# Patient Record
Sex: FEMALE | Race: BLACK OR AFRICAN AMERICAN | URBAN - METROPOLITAN AREA
[De-identification: names, ages, dates, MRNs, and addresses within clinical notes are randomized per-mention and may not be internally consistent; named-entity substitution may affect disease eponyms.]

---

## 2017-04-19 ENCOUNTER — OFFICE VISIT (OUTPATIENT)
Dept: OBGYN | Facility: CLINIC | Age: 27
End: 2017-04-19
Payer: COMMERCIAL

## 2017-04-19 VITALS
HEIGHT: 64 IN | WEIGHT: 209.7 LBS | OXYGEN SATURATION: 99 % | DIASTOLIC BLOOD PRESSURE: 70 MMHG | HEART RATE: 76 BPM | BODY MASS INDEX: 35.8 KG/M2 | SYSTOLIC BLOOD PRESSURE: 117 MMHG

## 2017-04-19 DIAGNOSIS — L68.9 EXCESSIVE HAIR GROWTH: ICD-10-CM

## 2017-04-19 DIAGNOSIS — N92.6 IRREGULAR MENSTRUAL CYCLE: ICD-10-CM

## 2017-04-19 DIAGNOSIS — Z30.016 ENCOUNTER FOR INITIAL PRESCRIPTION OF TRANSDERMAL PATCH HORMONAL CONTRACEPTIVE DEVICE: ICD-10-CM

## 2017-04-19 DIAGNOSIS — Z00.00 ROUTINE GENERAL MEDICAL EXAMINATION AT A HEALTH CARE FACILITY: Primary | ICD-10-CM

## 2017-04-19 PROCEDURE — G0145 SCR C/V CYTO,THINLAYER,RESCR: HCPCS | Performed by: OBSTETRICS & GYNECOLOGY

## 2017-04-19 PROCEDURE — 99395 PREV VISIT EST AGE 18-39: CPT | Performed by: OBSTETRICS & GYNECOLOGY

## 2017-04-19 RX ORDER — NORELGESTROMIN AND ETHINYL ESTRADIOL 35; 150 UG/MG; UG/MG
1 PATCH TRANSDERMAL WEEKLY
Qty: 9 PATCH | Refills: 3 | Status: SHIPPED | OUTPATIENT
Start: 2017-04-19 | End: 2018-01-12

## 2017-04-19 ASSESSMENT — ANXIETY QUESTIONNAIRES
IF YOU CHECKED OFF ANY PROBLEMS ON THIS QUESTIONNAIRE, HOW DIFFICULT HAVE THESE PROBLEMS MADE IT FOR YOU TO DO YOUR WORK, TAKE CARE OF THINGS AT HOME, OR GET ALONG WITH OTHER PEOPLE: SOMEWHAT DIFFICULT
7. FEELING AFRAID AS IF SOMETHING AWFUL MIGHT HAPPEN: NEARLY EVERY DAY
6. BECOMING EASILY ANNOYED OR IRRITABLE: SEVERAL DAYS
5. BEING SO RESTLESS THAT IT IS HARD TO SIT STILL: SEVERAL DAYS
1. FEELING NERVOUS, ANXIOUS, OR ON EDGE: SEVERAL DAYS
GAD7 TOTAL SCORE: 13
2. NOT BEING ABLE TO STOP OR CONTROL WORRYING: NEARLY EVERY DAY
3. WORRYING TOO MUCH ABOUT DIFFERENT THINGS: NEARLY EVERY DAY

## 2017-04-19 ASSESSMENT — PATIENT HEALTH QUESTIONNAIRE - PHQ9: 5. POOR APPETITE OR OVEREATING: SEVERAL DAYS

## 2017-04-19 NOTE — MR AVS SNAPSHOT
After Visit Summary   4/19/2017    Adelina Bansal    MRN: [id number]           Patient Information     Date Of Birth          1990        Visit Information        Provider Department      4/19/2017 8:30 AM Malina Mccord DO [facility]        Today's Diagnoses     Routine general medical examination at a health care facility    -  1      Care Instructions                                                         If you have any questions regarding your visit, Please contact your care team.    [facility] CLINIC HOURS TELEPHONE NUMBER   Malina Mccord DO.    ANDRES Ardon -    CAIT Brown RN       [facility], [facility]  8:30a.m-5:00 p.m   [facility]  [address] [place] [address]  [phone] ask for [facility]    Imaging [phone]       Urgent Care locations:    [facility] Saturday and Sunday   9 am - 5 pm    Monday-Friday   12 pm - 8 pm  Saturday and Sunday   9 am - 5 pm   [phone]     [facility] Labor and Delivery:  [phone]    If you need a medication refill, please contact your pharmacy. Please allow 3 business days for your refill to be completed.  As always, Thank you for trusting us with your healthcare needs!              Follow-ups after your visit        Who to contact     If you have questions or need follow up information about today's clinic visit or your schedule please contact [facility] directly at [phone].  Normal or non-critical lab and imaging results will be communicated to you by MyChart, letter or phone within 4 business days after the clinic has received the results. If you do not hear from us within 7 days, please contact the clinic through MyChart or phone. If you have a critical or abnormal lab result, we will notify you by phone as soon as  "possible.  Submit refill requests through [employer] or call your pharmacy and they will forward the refill request to us. Please allow 3 business days for your refill to be completed.          Additional Information About Your Visit        [employer] Malaw[employer]arT[employer]opSwap Information     [employer] gives you secure access to your electronic health record. If you see a primary care provider, you can also send messages to your care team and make appointments. If you have questions, please call your primary care clinic.  If you do not have a primary care provider, please call [phone] and they will assist you.        Care EveryWhere ID     This is your Care EveryWhere ID. This could be used by other organizations to access your [place] medical records  [id number]        Your Vitals Were     Pulse Height Last Period Pulse Oximetry Breastfeeding? BMI (Body Mass Index)    76 1.613 m (5' 3.5\") (LMP Unknown) 99% No 36.56 kg/m2       Blood Pressure from Last 3 Encounters:   04/19/17 117/70   03/10/16 110/68   02/29/16 94/66    Weight from Last 3 Encounters:   04/19/17 95.1 kg (209 lb 11.2 oz)   03/10/16 86.6 kg (191 lb)   02/29/16 87.2 kg (192 lb 4.8 oz)              We Performed the Following     Pap imaged thin layer screen reflex to HPV if ASCUS - recommend age 25 - 29          Today's Medication Changes          These changes are accurate as of: 4/19/17  8:36 AM.  If you have any questions, ask your nurse or doctor.               Stop taking these medicines if you haven't already. Please contact your care team if you have questions.     clindamycin 1 % solution   Commonly known as:  CLEOCIN T   Stopped by:  Malina Mccord, DO           levonorgestrel-ethinyl estradiol 0.1-20 MG-MCG per tablet   Commonly known as:  VERENA ELAINE LESSINA   Stopped by:  Malina Mccord DO           triamcinolone 0.1 % cream   Commonly known as:  KENALOG   Stopped by:  Malina Mccord DO                    Primary Care Provider Office Phone # " Fax #    Patricia Maya [phone]       [facility] [address]  [facility] [address]        Thank you!     Thank you for choosing [facility]  for your care. Our goal is always to provide you with excellent care. Hearing back from our patients is one way we can continue to improve our services. Please take a few minutes to complete the written survey that you may receive in the mail after your visit with us. Thank you!             Your Updated Medication List - Protect others around you: Learn how to safely use, store and throw away your medicines at www.disposemymeds.org.          This list is accurate as of: 4/19/17  8:36 AM.  Always use your most recent med list.                   Brand Name Dispense Instructions for use    norelgestromin-ethinyl estradiol 150-35 MCG/24HR patch    ORTHO EVRA    9 patch    Place 1 patch onto the skin once a week

## 2017-04-19 NOTE — NURSING NOTE
"Chief Complaint   Patient presents with     Physical     ?PCOS       Initial /70 (Cuff Size: Adult Large)  Pulse 76  Ht 1.613 m (5' 3.5\")  Wt 95.1 kg (209 lb 11.2 oz)  LMP  (LMP Unknown)  SpO2 99%  Breastfeeding? No  BMI 36.56 kg/m2 Estimated body mass index is 36.56 kg/(m^2) as calculated from the following:    Height as of this encounter: 1.613 m (5' 3.5\").    Weight as of this encounter: 95.1 kg (209 lb 11.2 oz).  Medication Reconciliation:   Reva Egan, [facility]  April 19, 2017 8:35 AM        "

## 2017-04-19 NOTE — PROGRESS NOTES
Adelina is a 26 year old female, , who is here for her annual exam and has concerns regarding possible PCOS.  She admits to irregular menses where she may skip up to 6 months at a time.  She also has noticed unwanted facial hair in the chin area so met with a dermatologist.  He suggested that she be treated for PCOS first and if this failed, then he would discuss hair removal options with her.  She has not been using anything for contraception over the past 6 months since she forgot to  her script for the Ortho Evra patch after her last visit.  She dislikes taking the BCP since it causes her stomach upset.  Her last US from 3/10/16 was normal without signs of PCOS so will recheck a year+ later.  She admits to a strong family hx of insulin-dependent diabetes including her dad and older sister.  She is not in a fasting state this morning so will have her return for a glucose screen plus a FSH and LH.  She is not wanting to try for pregnancy at this time but may decide to in 6 or more months.  She restarted smoking 1/ ppd so was encouraged to quit.  She declined a script for medication for smoking cessation since she feels that she can quit on her own.      ROS: Ten point review of systems was reviewed and negative except the above.    Health Maintenance   Topic Date Due     TETANUS IMMUNIZATION (SYSTEM ASSIGNED)  2017     INFLUENZA VACCINE (SYSTEM ASSIGNED)  2017     PAP Q3 YR  10/29/2018     LIPID MONITORING Q5 YEARS (NO INBASKET)  2018      Last pap: normal 10/29/15  Last Mammogram: none  Last Dexa: not applicable  Last Colonoscopy: not applicable  Lab Results   Component Value Date    CHOL 158 2013     Lab Results   Component Value Date    HDL 33 2013     Lab Results   Component Value Date     2013     Lab Results   Component Value Date    TRIG 119 2013     Lab Results   Component Value Date    CHOLHDLRATIO 4.9 2013         OBHX:      PSH:   Past  "Surgical History:   Procedure Laterality Date     NO HISTORY OF SURGERY           PMH: Her past medical, surgical, and obstetric histories were reviewed and are documented in their appropriate chart areas.    ALL/Meds: Her medication and allergy histories were reviewed and are documented in their appropriate chart areas.    SH/FMH: Her social and family history was reviewed and documented in its appropriate chart area.    PE: /70 (Cuff Size: Adult Large)  Pulse 76  Ht 1.613 m (5' 3.5\")  Wt 95.1 kg (209 lb 11.2 oz)  LMP  (LMP Unknown)  SpO2 99%  Breastfeeding? No  BMI 36.56 kg/m2  Body mass index is 36.56 kg/(m^2).    General Appearance:  healthy, alert, active, no distress  Cardiovascular:  Regular rate and Rhythm without murmur  Neck: Supple, no adenopathy and thyroid normal  Lungs:  Clear, without wheeze, rale or rhonchi  Breast: normal breast exam  Abdomen: Benign, Soft, flat, non-tender, No masses, organomegaly, No inguinal nodes and Bowel sounds normoactive.   Pelvic:       - Ext: Vulva and perineum are normal without lesion, mass or discharge        - Urethra: normal without discharge        - Urethral Meatus: normal appearance       - Bladder: no tenderness, no masses       - Vagina: Normal mucosa, no discharge        - Cervix: normal and nulliparous       - Uterus:Normal shape, position and consistency        - Adnexa: Normal without masses or tenderness       - Rectal: deferred    A/P:   Assessment:  Well woman exam (Annual)  Contraceptive consult  PCOS consult  Strong family hx of diabetes (two first-degree relatives)  Smoking cessation consult  Plan:   -  I discussed the new pap recommendations regarding screening.  Explained the rationale for increased intervals between paps.  Questions asked and answered.  She does agree to this regiment.  Pap was performed and submitted   -  BC: Ortho Evra patch script sent to her pharmacy with instructions reviewed   -  Return in a fasting state for " labwork including a glucose given her strong family hx of insulin-dependent diabetes   -  Schedule a pelvic US and labwork for PCOS workup   -  Smoking cessation discussed and pt will try Nicorette gum   -  Refer to Derm if continues to have unwanted hair growth  Orders Placed This Encounter   Procedures     US Pelvic Complete w Transvaginal     Pap imaged thin layer screen reflex to HPV if ASCUS - recommend age 25 - 29     Follicle stimulating hormone     Lutropin     Glucose     DERMATOLOGY REFERRAL      - Encouraged self-breast exam   - Encouraged low fat diet, regular exercise, and adequate calcium intake.    Malina Mccord, , FACOG, FACS

## 2017-04-19 NOTE — PATIENT INSTRUCTIONS
If you have any questions regarding your visit, Please contact your care team.    [facility] CLINIC HOURS TELEPHONE NUMBER   Malina DO Flex.    ANDRES Ardon -    CAIT Brown RN       Monday, Wednesday, Thursday and Friday, [place]  8:30a.m-5:00 p.m   [facility]  [address] [place] [address]  [phone] ask for [facility]    Imaging [phone]       Urgent Care locations:    [facility] Saturday and Sunday   9 am - 5 pm    Monday-Friday   12 pm - 8 pm  Saturday and Sunday   9 am - 5 pm   [phone]     [facility] Labor and Delivery:  [phone]    If you need a medication refill, please contact your pharmacy. Please allow 3 business days for your refill to be completed.  As always, Thank you for trusting us with your healthcare needs!

## 2017-04-20 ASSESSMENT — ANXIETY QUESTIONNAIRES: GAD7 TOTAL SCORE: 13

## 2017-04-20 ASSESSMENT — PATIENT HEALTH QUESTIONNAIRE - PHQ9: SUM OF ALL RESPONSES TO PHQ QUESTIONS 1-9: 6

## 2017-04-21 LAB
COPATH REPORT: NORMAL
PAP: NORMAL

## 2017-06-26 DIAGNOSIS — N92.6 IRREGULAR MENSTRUAL CYCLE: ICD-10-CM

## 2017-06-26 DIAGNOSIS — L68.9 EXCESSIVE HAIR GROWTH: ICD-10-CM

## 2017-06-26 LAB
FSH SERPL-ACNC: 5.6 IU/L
GLUCOSE SERPL-MCNC: 98 MG/DL (ref 70–99)
LH SERPL-ACNC: 9.1 IU/L

## 2017-06-26 PROCEDURE — 83001 ASSAY OF GONADOTROPIN (FSH): CPT | Performed by: OBSTETRICS & GYNECOLOGY

## 2017-06-26 PROCEDURE — 82947 ASSAY GLUCOSE BLOOD QUANT: CPT | Performed by: OBSTETRICS & GYNECOLOGY

## 2017-06-26 PROCEDURE — 83002 ASSAY OF GONADOTROPIN (LH): CPT | Performed by: OBSTETRICS & GYNECOLOGY

## 2017-06-26 PROCEDURE — 36415 COLL VENOUS BLD VENIPUNCTURE: CPT | Performed by: OBSTETRICS & GYNECOLOGY

## 2017-07-27 ENCOUNTER — RADIANT APPOINTMENT (OUTPATIENT)
Dept: ULTRASOUND IMAGING | Facility: CLINIC | Age: 27
End: 2017-07-27
Attending: OBSTETRICS & GYNECOLOGY
Payer: COMMERCIAL

## 2017-07-27 DIAGNOSIS — N92.6 IRREGULAR MENSTRUAL CYCLE: ICD-10-CM

## 2017-07-27 PROCEDURE — 76856 US EXAM PELVIC COMPLETE: CPT | Performed by: STUDENT IN AN ORGANIZED HEALTH CARE EDUCATION/TRAINING PROGRAM

## 2017-07-27 PROCEDURE — 76830 TRANSVAGINAL US NON-OB: CPT | Performed by: STUDENT IN AN ORGANIZED HEALTH CARE EDUCATION/TRAINING PROGRAM

## 2017-08-02 ENCOUNTER — TELEPHONE (OUTPATIENT)
Dept: OBGYN | Facility: CLINIC | Age: 27
End: 2017-08-02

## 2017-08-02 NOTE — TELEPHONE ENCOUNTER
[facility] Call Center    Phone Message    Name of Caller: Adelina    Phone Number: Cell number on file:    Telephone Information:   Mobile [phone]       Best time to return call: any    May a detailed message be left on voicemail: yes    Relation to patient: Self    Reason for Call: Other: Patient has quetions about lab work. Please advise     Action Taken: Message routed to:  [facility] p [phone]

## 2017-08-03 NOTE — TELEPHONE ENCOUNTER
Notes Recorded by Malina Mccord DO on 7/29/2017 at 3:39 PM  Your recent ultrasound results showed probable polycystic ovarian syndrome so is consistent with your history.  Please call with any questions    Patient called as she received the message as below but not sure if her dx is definitive. Patient stated she would like to know about a tx plan and agreed to discuss in an OV. Patient stated she has not started Ortho Sunitha patch yet but will do that soon. Patient agreed to wait for an appt with Dr. Gabriel on 08-23-17 at 1600 as she wanted a late day appt. Patient very appreciative of assistance. Jeanette Reich RN, BAN

## 2017-08-23 ENCOUNTER — OFFICE VISIT (OUTPATIENT)
Dept: OBGYN | Facility: CLINIC | Age: 27
End: 2017-08-23
Payer: COMMERCIAL

## 2017-08-23 VITALS
SYSTOLIC BLOOD PRESSURE: 110 MMHG | DIASTOLIC BLOOD PRESSURE: 75 MMHG | WEIGHT: 221.9 LBS | HEART RATE: 83 BPM | BODY MASS INDEX: 38.69 KG/M2 | OXYGEN SATURATION: 97 %

## 2017-08-23 DIAGNOSIS — E28.2 PCOS (POLYCYSTIC OVARIAN SYNDROME): ICD-10-CM

## 2017-08-23 DIAGNOSIS — Z13.1 SCREENING FOR DIABETES MELLITUS: Primary | ICD-10-CM

## 2017-08-23 PROCEDURE — 99214 OFFICE O/P EST MOD 30 MIN: CPT | Performed by: OBSTETRICS & GYNECOLOGY

## 2017-08-23 NOTE — NURSING NOTE
"Chief Complaint   Patient presents with     RECHECK     go over ultrasound--? PCOS       Initial /75  Pulse 83  Wt 100.7 kg (221 lb 14.4 oz)  LMP 07/01/2017  SpO2 97%  Breastfeeding? No  BMI 38.69 kg/m2 Estimated body mass index is 38.69 kg/(m^2) as calculated from the following:    Height as of 4/19/17: 1.613 m (5' 3.5\").    Weight as of this encounter: 100.7 kg (221 lb 14.4 oz).  Medication Reconciliation:   Reva Egan, [facility]  August 23, 2017 4:28 PM        "

## 2017-08-23 NOTE — PROGRESS NOTES
"This 28 y/o female, , presents to discuss her recent US results and PCOS diagnosis.  She was prescribed the Ortho Evra patch at her last appt in 2017, but has not started using them yet since she states that she is a \"hypochondriac.\"  We discussed her fears and she now feels comfortable taking these.  /75  Pulse 83  Wt 100.7 kg (221 lb 14.4 oz)  LMP 2017  SpO2 97%  Breastfeeding? No  BMI 38.69 kg/m2  ROS:  10 systems were reviewed and was + for PCOS, hx of chlamydia, and esophageal reflux.  A PE was not performed today.  Assessment - PCOS  Plan - Her recent US results from 17 were reviewed with the patient.  Both written and verbal information were provided on PCOS and she plans to start the Ortho Evra patches this month.  She is considering trying for pregnancy in 1-2 years so will remain on contraception (combined estrogen and progesterone) until then.  Will check a fasting glucose for diabetic screening.  All her questions were addressed.  This was a 30-minute visit and over 50% of the time was spent in direct pt consultation.  "

## 2017-08-23 NOTE — PATIENT INSTRUCTIONS
If you have any questions regarding your visit, Please contact your care team.    Women [facility] CLINIC HOURS TELEPHONE NUMBER   Malina DO Flex.    ANDRES Ardon -    CAIT Brown RN       Monday, Wednesday, Thursday and Friday, [place]  8:30a.m-5:00 p.m   [facility]  [address] [place] [address]  [phone] ask for [facility]    Imaging [phone]       Urgent Care locations:    [facility] Saturday and Sunday   9 am - 5 pm    Monday-Friday   12 pm - 8 pm  Saturday and Sunday   9 am - 5 pm   [phone]     [facility] Labor and Delivery:  [phone]    If you need a medication refill, please contact your pharmacy. Please allow 3 business days for your refill to be completed.  As always, Thank you for trusting us with your healthcare needs!

## 2017-08-23 NOTE — MR AVS SNAPSHOT
After Visit Summary   8/23/2017    Adelina Bansal    MRN: [id number]           Patient Information     Date Of Birth          1990        Visit Information        Provider Department      8/23/2017 4:30 PM Malina Mccord DO [facility]        Care Instructions                                                         If you have any questions regarding your visit, Please contact your care team.    [facility] CLINIC HOURS TELEPHONE NUMBER   Malina Mccord DO.    ANDRES Ardon -    CAIT Brown RN       Monday, Wednesday, Thursday and Friday, [place]  8:30a.m-5:00 p.m   [facility]  [address]  [place], [place] [address] [phone] ask for [facility]    Imaging [phone]       Urgent Care locations:    [facility] Saturday and Sunday   9 am - 5 pm    Monday-Friday   12 pm - 8 pm  Saturday and Sunday   9 am - 5 pm   [phone]     [facility] Labor and Delivery:  [phone]    If you need a medication refill, please contact your pharmacy. Please allow 3 business days for your refill to be completed.  As always, Thank you for trusting us with your healthcare needs!                Follow-ups after your visit        Your next 10 appointments already scheduled     Aug 23, 2017  4:30 PM CDT   Office Visit with Malina Mccord DO   [facility] ([facility])    [address]  [facility] [phone] [phone]           Bring a current list of meds and any records pertaining to this visit. For Physicals, please bring immunization records and any forms needing to be filled out. Please arrive 10 minutes early to complete paperwork.              Who to contact     If you have questions or need follow up information about today's clinic visit or your schedule please contact [facility]  [place] directly at [phone].  Normal or non-critical lab and imaging results will be communicated to you by dscoveredhart, letter or phone within 4 business days after the clinic has received the results. If you do not hear from us within 7 days, please contact the clinic through dscoveredhart or phone. If you have a critical or abnormal lab result, we will notify you by phone as soon as possible.  Submit refill requests through [employer] or call your pharmacy and they will forward the refill request to us. Please allow 3 business days for your refill to be completed.          Additional Information About Your Visit        dscoveredharVisualnet Information     [employer] gives you secure access to your electronic health record. If you see a primary care provider, you can also send messages to your care team and make appointments. If you have questions, please call your primary care clinic.  If you do not have a primary care provider, please call [phone] and they will assist you.        Care EveryWhere ID     This is your Care EveryWhere ID. This could be used by other organizations to access your [place] medical records  [id number]        Your Vitals Were     Pulse Last Period Pulse Oximetry Breastfeeding? BMI (Body Mass Index)       83 07/01/2017 97% No 38.69 kg/m2        Blood Pressure from Last 3 Encounters:   08/23/17 110/75   04/19/17 117/70   03/10/16 110/68    Weight from Last 3 Encounters:   08/23/17 100.7 kg (221 lb 14.4 oz)   04/19/17 95.1 kg (209 lb 11.2 oz)   03/10/16 86.6 kg (191 lb)              Today, you had the following     No orders found for display         Today's Medication Changes          These changes are accurate as of: 8/23/17  4:29 PM.  If you have any questions, ask your nurse or doctor.               These medicines have changed or have updated prescriptions.        Dose/Directions    norelgestromin-ethinyl estradiol 150-35 MCG/24HR patch   Commonly known as:  ORTHO EVRA   This may have changed:   Another medication with the same name was removed. Continue taking this medication, and follow the directions you see here.   Used for:  Encounter for initial prescription of transdermal patch hormonal contraceptive device   Changed by:  Malina Mccord DO        Dose:  1 patch   Place 1 patch onto the skin once a week   Quantity:  9 patch   Refills:  3                Primary Care Provider Office Phone # Fax #    Patricia Maya [phone] [address]  [facility] [address]        Equal Access to Services     [facility]: Hadii aad ku hadasho Soomaali, waaxda luqadaha, qaybta kaalmada adeegyada, waxay idiin hayaan georgi bourne . So [facility] [phone].    ATENCIÓN: Si habla español, tiene a beauchamp disposición servicios gratuitos de asistencia lingüística. Llame al [phone].    We comply with applicable federal civil rights laws and [place] laws. We do not discriminate on the basis of race, color, national origin, age, disability sex, sexual orientation or gender identity.            Thank you!     Thank you for choosing [facility]  for your care. Our goal is always to provide you with excellent care. Hearing back from our patients is one way we can continue to improve our services. Please take a few minutes to complete the written survey that you may receive in the mail after your visit with us. Thank you!             Your Updated Medication List - Protect others around you: Learn how to safely use, store and throw away your medicines at www.disposemymeds.org.          This list is accurate as of: 8/23/17  4:29 PM.  Always use your most recent med list.                   Brand Name Dispense Instructions for use Diagnosis    norelgestromin-ethinyl estradiol 150-35 MCG/24HR patch    ORTHO EVRA    9 patch    Place 1 patch onto the skin once a week    Encounter for initial prescription of transdermal patch hormonal contraceptive device

## 2018-01-12 ENCOUNTER — RADIANT APPOINTMENT (OUTPATIENT)
Dept: GENERAL RADIOLOGY | Facility: CLINIC | Age: 28
End: 2018-01-12
Attending: NURSE PRACTITIONER
Payer: COMMERCIAL

## 2018-01-12 ENCOUNTER — OFFICE VISIT (OUTPATIENT)
Dept: PEDIATRICS | Facility: CLINIC | Age: 28
End: 2018-01-12
Payer: COMMERCIAL

## 2018-01-12 VITALS
WEIGHT: 228.9 LBS | SYSTOLIC BLOOD PRESSURE: 99 MMHG | DIASTOLIC BLOOD PRESSURE: 60 MMHG | TEMPERATURE: 97.2 F | HEART RATE: 78 BPM | HEIGHT: 64 IN | OXYGEN SATURATION: 99 % | BODY MASS INDEX: 39.08 KG/M2

## 2018-01-12 DIAGNOSIS — M25.562 LEFT KNEE PAIN, UNSPECIFIED CHRONICITY: ICD-10-CM

## 2018-01-12 DIAGNOSIS — F17.200 TOBACCO USE DISORDER: ICD-10-CM

## 2018-01-12 DIAGNOSIS — M25.562 LEFT KNEE PAIN, UNSPECIFIED CHRONICITY: Primary | ICD-10-CM

## 2018-01-12 PROCEDURE — 99214 OFFICE O/P EST MOD 30 MIN: CPT | Performed by: NURSE PRACTITIONER

## 2018-01-12 PROCEDURE — 73560 X-RAY EXAM OF KNEE 1 OR 2: CPT | Mod: LT | Performed by: RADIOLOGY

## 2018-01-12 RX ORDER — BUPROPION HYDROCHLORIDE 150 MG/1
TABLET, EXTENDED RELEASE ORAL
Qty: 60 TABLET | Refills: 2 | Status: SHIPPED | OUTPATIENT
Start: 2018-01-12

## 2018-01-12 NOTE — PROGRESS NOTES
SUBJECTIVE:   Adelina Bansal is a 27 year old female who presents to clinic today for the following health issues:    Joint Pain    Onset: 1 year    Description:   Location: left knee  Character: Sharp    Intensity: mild    Progression of Symptoms: worse    Accompanying Signs & Symptoms:  Other symptoms: none    History:   Previous similar pain: no       Precipitating factors:   Trauma or overuse: YES- has gained 40 lbs within the last year    Alleviating factors:  Improved by: rest/inactivity    Therapies Tried and outcome: massage, knee brace, tylenol   Left knee pain for at least a year  Got worse with weight gain  Got worse after being on the knee for work at [employer] and on her feet  No instability   Just takes the Tylenol       2. Also wants to go on the gum for quitting smoking   Trying to transition to a health lifestyle      Problem list and histories reviewed & adjusted, as indicated.  Additional history: as documented    Patient Active Problem List   Diagnosis     Abnormal menses     CARDIOVASCULAR SCREENING; LDL GOAL LESS THAN 160     Chlamydia infection     Obesity     Tobacco use disorder     Contraception     Esophageal reflux     Past Surgical History:   Procedure Laterality Date     NO HISTORY OF SURGERY         Social History   Substance Use Topics     Smoking status: Current Every Day Smoker     Packs/day: 0.10     Years: 5.00     Types: Cigarettes     Start date: 1/11/2011     Last attempt to quit: 6/1/2015     Smokeless tobacco: Never Used     Alcohol use 3.5 oz/week      Comment: Socially     Family History   Problem Relation Age of Onset     C.A.D. Paternal Grandmother      3 MIs     DIABETES Paternal Grandmother      Hypertension Paternal Grandmother      CEREBROVASCULAR DISEASE Paternal Grandmother      DIABETES Father      Hypertension Mother      Connective Tissue Disorder Maternal Aunt      Lupus     Asthma Sister      twin     Asthma Sister          No current outpatient  "prescriptions on file.     No Known Allergies  Labs reviewed in EPIC      Reviewed and updated as needed this visit by clinical staffTobacco  Allergies  Meds  Med Hx  Surg Hx  Fam Hx  Soc Hx      Reviewed and updated as needed this visit by Provider         ROS:  Constitutional, HEENT, cardiovascular, pulmonary, gi and gu systems are negative, except as otherwise noted.      OBJECTIVE:   BP 99/60 (BP Location: Right arm, Patient Position: Sitting, Cuff Size: Adult Large)  Pulse 78  Temp 97.2  F (36.2  C) (Temporal)  Ht 5' 3.5\" (1.613 m)  Wt 228 lb 14.4 oz (103.8 kg)  LMP 12/20/2017  SpO2 99%  Breastfeeding? No  BMI 39.91 kg/m2  Body mass index is 39.91 kg/(m^2).  GENERAL APPEARANCE: alert, active, no distress and Nourishment obese   RESP: lungs clear to auscultation - no rales, rhonchi or wheezes  CV: regular rates and rhythm and no murmur, click or rub  MS: extremities normal- no gross deformities noted and peripheral pulses normal  Knees reveal full range of motion, no tenderness, masses, effusion or ligamentous instability.  SKIN: no suspicious lesions or rashes  PSYCH: mentation appears normal and affect normal/bright    Diagnostic Test Results:  Recent Results (from the past 744 hour(s))   XR Knee Left 1/2 Views    Narrative    EXAM: XR KNEE LT 1/2 VW  1/12/2018 3:10 PM      HISTORY: ; Left knee pain, unspecified chronicity    COMPARISON: None    FINDINGS: AP and lateral views of the left knee.    No acute osseous abnormality. Small joint effusion. Posterior cortical  desmoid.    No substantial degenerative change. Soft tissues unremarkable.       Impression    IMPRESSION: No acute osseous abnormality. No substantial degenerative  change.    KHRIS ARIAS MD (Joe)         ASSESSMENT/PLAN:     Adelina was seen today for knee pain.    Diagnoses and all orders for this visit:    Left knee pain, unspecified chronicity  -     XR Knee Left 1/2 Views; Future  -     PHYSICAL THERAPY " REFERRAL  Symptomatic therapy suggested: OTC ibuprofen, aleve and call prn if symptoms persist or worsen.  referral to Physical Therapy    Tobacco use disorder  -     buPROPion (WELLBUTRIN SR) 150 MG 12 hr tablet; Take 1 tablet once daily and increase to 1 tablet twice daily after 4 to 7 days  I discussed the nature of nicotine addiction and the numerous direct physical and psychological benefits of smoking cessation.  Also discussed and encouraged behavioral therapy.  I reviewed available pharmacotherapeutic options available to assist with the process along with risks and benefits.    PLAN:   1.   Symptomatic therapy suggested: OTC aleve and call prn if symptoms persist or worsen.  2.  Orders Placed This Encounter   Medications     buPROPion (WELLBUTRIN SR) 150 MG 12 hr tablet     Sig: Take 1 tablet once daily and increase to 1 tablet twice daily after 4 to 7 days     Dispense:  60 tablet     Refill:  2     Orders Placed This Encounter   Procedures     XR Knee Left 1/2 Views     PHYSICAL THERAPY REFERRAL     3. Patient needs to follow up in if no improvement,or sooner if worsening of symptoms or other symptoms develop.  FURTHER TESTING:       - xray left knee  CONSULTATION/REFERRAL to physical therapy  Will follow up and/or notify patient of  results via My Chart to determine further need for followup        See Patient Instructions    AUDREY Bruno CNP  M [facility]

## 2018-01-12 NOTE — PATIENT INSTRUCTIONS
PLAN:   1.   Symptomatic therapy suggested: OTC aleve and call prn if symptoms persist or worsen.  2.  Orders Placed This Encounter   Medications     buPROPion (WELLBUTRIN SR) 150 MG 12 hr tablet     Sig: Take 1 tablet once daily and increase to 1 tablet twice daily after 4 to 7 days     Dispense:  60 tablet     Refill:  2     Orders Placed This Encounter   Procedures     XR Knee Left 1/2 Views     PHYSICAL THERAPY REFERRAL     3. Patient needs to follow up in if no improvement,or sooner if worsening of symptoms or other symptoms develop.  FURTHER TESTING:       - xray left knee  CONSULTATION/REFERRAL to physical therapy  Will follow up and/or notify patient of  results via My Chart to determine further need for followup        It was a pleasure seeing you today at the [facility]. Thank you for allowing us to care for you today. We truly hope we provided you with the excellent service you deserve. Please let us know if there is anything else we can do for you so we can be sure you are leaving completley satisfied with your care experience.       General information about your clinic   Clinic Hours Lab Hours (Appointments are required)   Mon-Thurs: 7:30 AM - 7 PM Mon-Thurs: 7:30 AM - 7 PM   Fri: 7:30 AM - 5 PM Fri: 7:30 AM - 5 PM        After Hours Nurse Advise & Appts:  Bj Nurse Advisors: [phone]  Bj On Call: to make appointments anytime: [phone] On Call Physician: call [phone] and answering service will page the on call physician.        For urgent appointments, please call [phone] and ask for the triage nurse or your care team clinic nurse.  How to contact my care team:  MyChart: [email or web address]   Phone: [phone]   Fax: [phone]       [facility]:   Phone: [phone]  Hours: 8:00 AM - 6:00 PM  Medication Refills:  Call your pharmacy and they will forward the refill to us. Please allow 3 business days for your refills to be  completed.       Normal or non-critical lab and imaging results will be communicated to you by MyChart, letter or phone within 7 days.  If you do not hear from us within 10 days, please call the clinic. If you have a critical or abnormal lab result, we will notify you by phone as soon as possible.       We now have PWIC (Pediatric Walk in Care)  Monday-Friday from 7:30-4. Simply walk in and be seen for your urgent needs like cough, fever, rash, diarrhea or vomiting, pink eye, UTI. No appointments needed. Ask one of the team for more information      -Your Care Team:    Dr. Katerin Rowley - Internal Medicine/Pediatrics   Dr. Patricia Maya - Family Medicine  Dr. Nitza Borrego - Pediatrics  Lara Floyd CNP - Family Practice Nurse Practitioner  Dr. Kathleen Purdy - Pediatrics

## 2018-01-12 NOTE — MR AVS SNAPSHOT
After Visit Summary   1/12/2018    Adelina Bansal    MRN: [id number]           Patient Information     Date Of Birth          1990        Visit Information        Provider Department      1/12/2018 2:30 PM Lara Floyd APRN CNP [facility]        Today's Diagnoses     Left knee pain, unspecified chronicity    -  1    Tobacco use disorder          Care Instructions    PLAN:   1.   Symptomatic therapy suggested: OTC aleve and call prn if symptoms persist or worsen.  2.  Orders Placed This Encounter   Medications     buPROPion (WELLBUTRIN SR) 150 MG 12 hr tablet     Sig: Take 1 tablet once daily and increase to 1 tablet twice daily after 4 to 7 days     Dispense:  60 tablet     Refill:  2     Orders Placed This Encounter   Procedures     XR Knee Left 1/2 Views     PHYSICAL THERAPY REFERRAL     3. Patient needs to follow up in if no improvement,or sooner if worsening of symptoms or other symptoms develop.  FURTHER TESTING:       - xray left knee  CONSULTATION/REFERRAL to physical therapy  Will follow up and/or notify patient of  results via My Chart to determine further need for followup        It was a pleasure seeing you today at the [facility]. Thank you for allowing us to care for you today. We truly hope we provided you with the excellent service you deserve. Please let us know if there is anything else we can do for you so we can be sure you are leaving completley satisfied with your care experience.       General information about your clinic   Clinic Hours Lab Hours (Appointments are required)   Mon-Thurs: 7:30 AM - 7 PM Mon-Thurs: 7:30 AM - 7 PM   Fri: 7:30 AM - 5 PM Fri: 7:30 AM - 5 PM        After Hours Nurse Advise & Appts:  Bj Nurse Advisors: [phone]  Bj On Call: to make appointments anytime: [phone] On Call Physician: call [phone] and answering service will page the on call physician.         For urgent appointments, please call [phone] and ask for the triage nurse or your care team clinic nurse.  How to contact my care team:  Leisa: [email or web address]   Phone: [phone]   Fax: [phone]       [facility]:   Phone: [phone]  Hours: 8:00 AM - 6:00 PM  Medication Refills:  Call your pharmacy and they will forward the refill to us. Please allow 3 business days for your refills to be completed.       Normal or non-critical lab and imaging results will be communicated to you by MyChart, letter or phone within 7 days.  If you do not hear from us within 10 days, please call the clinic. If you have a critical or abnormal lab result, we will notify you by phone as soon as possible.       We now have PWIC (Pediatric Walk in Care)  Monday-Friday from 7:30-4. Simply walk in and be seen for your urgent needs like cough, fever, rash, diarrhea or vomiting, pink eye, UTI. No appointments needed. Ask one of the team for more information      -Your Care Team:    Dr. Katerin Rowley - Internal Medicine/Pediatrics   Dr. Patricia Maya - Family Medicine  Dr. Nitza Borrego - Pediatrics  Lara Floyd CNP - Family Practice Nurse Practitioner  Dr. Kathleen Purdy - Pediatrics                       Follow-ups after your visit        Additional Services     PHYSICAL THERAPY REFERRAL       *This therapy referral will be filtered to a centralized scheduling office at [facility] and the patient will receive a call to schedule an appointment at a [facility] location most convenient for them. *     [facility] provides Physical Therapy evaluation and treatment and many specialty services across the [place] system.  If requesting a specialty program, please choose from the list below.    If you have not heard from the scheduling office within 2 business days, please call [phone] for all locations, with the exception of [place], please call [phone].  Treatment:  "Evaluation & Treatment  Special Instructions/Modalities:   Special Programs: None    Please be aware that coverage of these services is subject to the terms and limitations of your health insurance plan.  Call member services at your health plan with any benefit or coverage questions.      **Note to Provider:  If you are referring outside of [place] for the therapy appointment, please list the name of the location in the \"special instructions\" above, print the referral and give to the patient to schedule the appointment.                  Your next 10 appointments already scheduled     Jan 24, 2018  3:45 PM CST   [facility] Problem Visit with Malina Mccord, DO   [facility] ([facility])    [address] [address] [place] [id number] [phone]              Future tests that were ordered for you today     Open Future Orders        Priority Expected Expires Ordered    XR Knee Left 1/2 Views Routine 1/12/2018 1/12/2019 1/12/2018            Who to contact     If you have questions or need follow up information about today's clinic visit or your schedule please contact [facility] directly at [phone].  Normal or non-critical lab and imaging results will be communicated to you by [employer], letter or phone within 4 business days after the clinic has received the results. If you do not hear from us within 7 days, please contact the clinic through [employer] or phone. If you have a critical or abnormal lab result, we will notify you by phone as soon as possible.  Submit refill requests through [employer] or call your pharmacy and they will forward the refill request to us. Please allow 3 business days for your refill to be completed.          Additional Information About Your Visit        [employer] Information     [employer] gives you secure access to your electronic health record. If you see a primary care provider, you can also send messages to your care " "team and make appointments. If you have questions, please call your primary care clinic.  If you do not have a primary care provider, please call [phone] and they will assist you.      [employer] is an electronic gateway that provides easy, online access to your medical records. With [employer], you can request a clinic appointment, read your test results, renew a prescription or communicate with your care team.     To access your existing account, please contact your [facility] or call [phone] for assistance.        Care EveryWhere ID     This is your Care EveryWhere ID. This could be used by other organizations to access your [facility] medical records  [id number]        Your Vitals Were     Pulse Temperature Height Last Period Pulse Oximetry Breastfeeding?    78 97.2  F (36.2  C) (Temporal) 5' 3.5\" (1.613 m) 12/20/2017 99% No    BMI (Body Mass Index)                   39.91 kg/m2            Blood Pressure from Last 3 Encounters:   01/12/18 99/60   08/23/17 110/75   04/19/17 117/70    Weight from Last 3 Encounters:   01/12/18 228 lb 14.4 oz (103.8 kg)   08/23/17 221 lb 14.4 oz (100.7 kg)   04/19/17 209 lb 11.2 oz (95.1 kg)              We Performed the Following     PHYSICAL THERAPY REFERRAL          Today's Medication Changes          These changes are accurate as of: 1/12/18  2:51 PM.  If you have any questions, ask your nurse or doctor.               Start taking these medicines.        Dose/Directions    buPROPion 150 MG 12 hr tablet   Commonly known as:  WELLBUTRIN SR   Used for:  Tobacco use disorder   Started by:  Lara Floyd APRN CNP        Take 1 tablet once daily and increase to 1 tablet twice daily after 4 to 7 days   Quantity:  60 tablet   Refills:  2            Where to get your medicines      These medications were sent to [facility] - [place], [place] - [address] [facility] [address]  [address], [place] [address]     Phone:  " [phone]     buPROPion 150 MG 12 hr tablet                Primary Care Provider Office Phone # Fax #    Patricia Maya [phone] [address] [facility] [address]        Equal Access to Services     [facility] Center[facility] : Hadii aad ku hadasho Soomaali, waaxda luqadaha, qaybta kaalmada adeegyada, waxay ignacioin hayricardon adeeduarda analeopoldo bourne . So [facility] [phone].    ATENCIÓN: Si habla español, tiene a beauchamp disposición servicios gratuitos de asistencia lingüística. Llame al [phone].    We comply with applicable federal civil rights laws and [place] laws. We do not discriminate on the basis of race, color, national origin, age, disability, sex, sexual orientation, or gender identity.            Thank you!     Thank you for choosing [facility]  for your care. Our goal is always to provide you with excellent care. Hearing back from our patients is one way we can continue to improve our services. Please take a few minutes to complete the written survey that you may receive in the mail after your visit with us. Thank you!             Your Updated Medication List - Protect others around you: Learn how to safely use, store and throw away your medicines at www.disposemymeds.org.          This list is accurate as of: 1/12/18  2:51 PM.  Always use your most recent med list.                   Brand Name Dispense Instructions for use Diagnosis    buPROPion 150 MG 12 hr tablet    WELLBUTRIN SR    60 tablet    Take 1 tablet once daily and increase to 1 tablet twice daily after 4 to 7 days    Tobacco use disorder

## 2018-01-12 NOTE — NURSING NOTE
"Chief Complaint   Patient presents with     Knee Pain     left knee pain x 1 year, no known injury       Initial BP 99/60 (BP Location: Right arm, Patient Position: Sitting, Cuff Size: Adult Large)  Pulse 78  Temp 97.2  F (36.2  C) (Temporal)  Ht 5' 3.5\" (1.613 m)  Wt 228 lb 14.4 oz (103.8 kg)  LMP 12/20/2017  SpO2 99%  Breastfeeding? No  BMI 39.91 kg/m2 Estimated body mass index is 39.91 kg/(m^2) as calculated from the following:    Height as of this encounter: 5' 3.5\" (1.613 m).    Weight as of this encounter: 228 lb 14.4 oz (103.8 kg).  Medication Reconciliation: complete      JAMES Valencia      "

## 2018-01-14 NOTE — PROGRESS NOTES
Dayron Bansal,    Attached are your test results.  Looks normal except for small amount of fluid  Will try physical therapy and if not better with that please let me know and will refer to orthopedics    Please contact us if you have any questions.    Lara Floyd, CNP

## 2018-03-21 ENCOUNTER — DOCUMENTATION ONLY (OUTPATIENT)
Dept: LAB | Facility: CLINIC | Age: 28
End: 2018-03-21

## 2018-03-21 NOTE — PROGRESS NOTES
I spoke to patient and informed her that she would need to see a provider to have the confirmation done.  Patient has already scheduled this with a different group.  I will close encounter.  Reva Egan, [facility]  March 21, 2018 2:13 PM

## 2018-03-21 NOTE — PROGRESS NOTES
Pt is coming into [facility] and is requesting a pregnancy test please place future lab order for pt 3-21  Thanks Stephanie LEDBETTER

## 2019-06-26 ENCOUNTER — OFFICE VISIT (OUTPATIENT)
Dept: PEDIATRICS | Facility: CLINIC | Age: 29
End: 2019-06-26
Payer: COMMERCIAL

## 2019-06-26 VITALS
HEIGHT: 64 IN | TEMPERATURE: 98.2 F | WEIGHT: 237.8 LBS | SYSTOLIC BLOOD PRESSURE: 119 MMHG | BODY MASS INDEX: 40.6 KG/M2 | DIASTOLIC BLOOD PRESSURE: 74 MMHG | HEART RATE: 86 BPM | OXYGEN SATURATION: 95 %

## 2019-06-26 DIAGNOSIS — Z13.0 SCREENING FOR DEFICIENCY ANEMIA: ICD-10-CM

## 2019-06-26 DIAGNOSIS — E66.01 MORBID OBESITY WITH BMI OF 40.0-44.9, ADULT (H): ICD-10-CM

## 2019-06-26 DIAGNOSIS — B35.3 TINEA PEDIS OF BOTH FEET: ICD-10-CM

## 2019-06-26 DIAGNOSIS — Z12.4 CERVICAL CANCER SCREENING: ICD-10-CM

## 2019-06-26 DIAGNOSIS — Z13.29 SCREENING FOR THYROID DISORDER: ICD-10-CM

## 2019-06-26 DIAGNOSIS — Z00.00 ROUTINE GENERAL MEDICAL EXAMINATION AT A HEALTH CARE FACILITY: Primary | ICD-10-CM

## 2019-06-26 DIAGNOSIS — Z13.1 SCREENING FOR DIABETES MELLITUS (DM): ICD-10-CM

## 2019-06-26 DIAGNOSIS — Z13.6 CARDIOVASCULAR SCREENING; LDL GOAL LESS THAN 160: ICD-10-CM

## 2019-06-26 PROCEDURE — 99395 PREV VISIT EST AGE 18-39: CPT | Performed by: FAMILY MEDICINE

## 2019-06-26 PROCEDURE — 99213 OFFICE O/P EST LOW 20 MIN: CPT | Mod: 25 | Performed by: FAMILY MEDICINE

## 2019-06-26 RX ORDER — NORELGESTROMIN AND ETHINYL ESTRADIOL 35; 150 UG/MG; UG/MG
1 PATCH TRANSDERMAL
COMMUNITY
Start: 2019-05-21 | End: 2020-05-20

## 2019-06-26 RX ORDER — KETOCONAZOLE 20 MG/G
CREAM TOPICAL 2 TIMES DAILY
Qty: 300 G | Refills: 3 | Status: SHIPPED | OUTPATIENT
Start: 2019-06-26

## 2019-06-26 ASSESSMENT — PAIN SCALES - GENERAL: PAINLEVEL: NO PAIN (0)

## 2019-06-26 ASSESSMENT — MIFFLIN-ST. JEOR: SCORE: 1775.71

## 2019-06-26 NOTE — LETTER
November 4, 2019      Adelina Bansal  [address]   [place] [address]            Dear Adelina Bansal:    This is to remind you that your provider wanted you to return to the clinic for lab test.    If you are coming in for Lipids and/or Glucose testing please fast for 10-12 hours. Morning medications can be taken with water.    You may call our office to schedule an appointment.    Please disregard this notice if you have already had your labs drawn or made an            appointment.    Sincerely,        Patricia Maya MD

## 2019-06-26 NOTE — PATIENT INSTRUCTIONS
Schedule for fasting labs   Schedule for skin consult in 4 weeks    Start using KETOCONZAOLE cream over the feet twice daily for 3-4 months      Preventive Health Recommendations  Female Ages 26 - 39  Yearly exam:   See your health care provider every year in order to    Review health changes.     Discuss preventive care.      Review your medicines if you your doctor has prescribed any.    Until age 30: Get a Pap test every three years (more often if you have had an abnormal result).    After age 30: Talk to your doctor about whether you should have a Pap test every 3 years or have a Pap test with HPV screening every 5 years.   You do not need a Pap test if your uterus was removed (hysterectomy) and you have not had cancer.  You should be tested each year for STDs (sexually transmitted diseases), if you're at risk.   Talk to your provider about how often to have your cholesterol checked.  If you are at risk for diabetes, you should have a diabetes test (fasting glucose).  Shots: Get a flu shot each year. Get a tetanus shot every 10 years.   Nutrition:     Eat at least 5 servings of fruits and vegetables each day.    Eat whole-grain bread, whole-wheat pasta and brown rice instead of white grains and rice.    Get adequate Calcium and Vitamin D.     Lifestyle    Exercise at least 150 minutes a week (30 minutes a day, 5 days of the week). This will help you control your weight and prevent disease.    Limit alcohol to one drink per day.    No smoking.     Wear sunscreen to prevent skin cancer.    See your dentist every six months for an exam and cleaning.

## 2019-06-26 NOTE — PROGRESS NOTES
SUBJECTIVE:   CC: Adelina Bansal is an 29 year old woman who presents for preventive health visit.     Healthy Habits: Patient is here for annual physical and with other concerns as mentioned below    RASH-has dry, peeling of skin on the bottom of both feet for several months  Patient thought it could be eczema has tried over-the-counter hydrocortisone cream with no relief of symptoms  Denies itching    Do you get at least three servings of calcium containing foods daily (dairy, green leafy vegetables, etc.)? yes    Amount of exercise or daily activities, outside of work: 0 day(s) per week    Problems taking medications regularly No    Medication side effects: No    Have you had an eye exam in the past two years? no    Do you see a dentist twice per year? yes    Do you have sleep apnea, excessive snoring or daytime drowsiness?no    QUESTIONS/ CONCERNS: Discuss testing for diabetes. Patient had screening labs through work insurance and reports her triglycerides level was elevated. Patient recently had a baby, baby girl is 6 month old.  Patient is currently not breast-feeding, denies previous history of gestational or diabetes mellitus.   Denies family or personal history of thyroid disorder             Today's PHQ-2 Score:   PHQ-2 (  [employer]) 2019   Q1: Little interest or pleasure in doing things 0 1   Q2: Feeling down, depressed or hopeless 1 1   PHQ-2 Score 1 2       Abuse: Current or Past(Physical, Sexual or Emotional)- No  Do you feel safe in your environment? Yes    Social History     Tobacco Use     Smoking status: Current Every Day Smoker     Packs/day: 0.10     Years: 5.00     Pack years: 0.50     Types: Cigarettes     Start date: 2011     Last attempt to quit: 2015     Years since quittin.0     Smokeless tobacco: Never Used   Substance Use Topics     Alcohol use: Yes     Alcohol/week: 3.5 oz     Comment: Socially     If you drink alcohol do you typically have >3  drinks per day or >7 drinks per week? No                     Reviewed orders with patient.  Reviewed health maintenance and updated orders accordingly - Yes  Lab work is in process  Labs reviewed in EPIC  BP Readings from Last 3 Encounters:   19 119/74   18 99/60   17 110/75    Wt Readings from Last 3 Encounters:   19 107.9 kg (237 lb 12.8 oz)   18 103.8 kg (228 lb 14.4 oz)   17 100.7 kg (221 lb 14.4 oz)                  Patient Active Problem List   Diagnosis     Abnormal menses     CARDIOVASCULAR SCREENING; LDL GOAL LESS THAN 160     Chlamydia infection     Obesity     Tobacco use disorder     Contraception     Esophageal reflux     Morbid obesity with BMI of 40.0-44.9, adult (H)     Past Surgical History:   Procedure Laterality Date     NO HISTORY OF SURGERY         Social History     Tobacco Use     Smoking status: Current Every Day Smoker     Packs/day: 0.10     Years: 5.00     Pack years: 0.50     Types: Cigarettes     Start date: 2011     Last attempt to quit: 2015     Years since quittin.0     Smokeless tobacco: Never Used   Substance Use Topics     Alcohol use: Yes     Alcohol/week: 3.5 oz     Comment: Socially     Family History   Problem Relation Age of Onset     C.A.D. Paternal Grandmother         3 MIs     Diabetes Paternal Grandmother      Hypertension Paternal Grandmother      Cerebrovascular Disease Paternal Grandmother      Diabetes Father      Hypertension Mother      Connective Tissue Disorder Maternal Aunt         Lupus     Asthma Sister         twin     Asthma Sister          Current Outpatient Medications   Medication Sig Dispense Refill     ketoconazole (NIZORAL) 2 % external cream Apply topically 2 times daily 300 g 3     norelgestromin-ethinyl estradiol (XULANE) 150-35 MCG/24HR patch Place 1 patch onto the skin       Prenatal Multivit-Min-Fe-FA (PRENATAL VITAMINS PO) Take 1 tablet by mouth daily       buPROPion (WELLBUTRIN SR) 150 MG 12 hr  tablet Take 1 tablet once daily and increase to 1 tablet twice daily after 4 to 7 days (Patient not taking: Reported on 2019) 60 tablet 2     No Known Allergies  Recent Labs   Lab Test 03/30/15  0945 13  1208 12  0907   LDL  --  102 105   HDL  --  33* 34*   TRIG  --  119 108   ALT  --  36  --    CR  --  0.80  --    GFRESTIMATED  --  88  --    GFRESTBLACK  --  >90  --    POTASSIUM  --  3.8  --    TSH 1.28  --  2.06        Mammogram not appropriate for this patient based on age.    Pertinent mammograms are reviewed under the imaging tab.  History of abnormal Pap smear: NO - age 30- 65 PAP every 3 years recommended  PAP / HPV 2017 10/29/2015 2012   PAP NIL NIL NIL     Reviewed and updated as needed this visit by clinical staff  Tobacco  Allergies  Meds  Med Hx  Surg Hx  Fam Hx  Soc Hx        Reviewed and updated as needed this visit by Provider          Past Medical History:   Diagnosis Date     Abnormal menses 2012      Past Surgical History:   Procedure Laterality Date     NO HISTORY OF SURGERY       OB History    Para Term  AB Living   0 0 0 0 0 0   SAB TAB Ectopic Multiple Live Births   0 0 0 0 0       ROS:  CONSTITUTIONAL: NEGATIVE for fever, chills, change in weight  INTEGUMENTARY/SKIN: as above  EYES: NEGATIVE for vision changes or irritation  ENT: NEGATIVE for ear, mouth and throat problems  RESP: NEGATIVE for significant cough or SOB  BREAST: NEGATIVE for masses, tenderness or discharge  CV: NEGATIVE for chest pain, palpitations or peripheral edema  GI: NEGATIVE for nausea, abdominal pain, heartburn, or change in bowel habits  : NEGATIVE for unusual urinary or vaginal symptoms. Periods are regular.  MUSCULOSKELETAL: NEGATIVE for significant arthralgias or myalgia  NEURO: NEGATIVE for weakness, dizziness or paresthesias  ENDOCRINE: NEGATIVE for temperature intolerance, skin/hair changes  HEME/ALLERGY/IMMUNE: NEGATIVE for bleeding problems  PSYCHIATRIC:  "NEGATIVE for changes in mood or affect    OBJECTIVE:   /74 (BP Location: Right arm, Patient Position: Sitting, Cuff Size: Adult Large)   Pulse 86   Temp 98.2  F (36.8  C) (Oral)   Ht 1.613 m (5' 3.5\")   Wt 107.9 kg (237 lb 12.8 oz)   LMP 06/21/2019 (Exact Date)   SpO2 95%   Breastfeeding? No   BMI 41.46 kg/m    EXAM:  GENERAL: healthy, alert, no distress and morbidly obese  EYES: Eyes grossly normal to inspection, PERRL and conjunctivae and sclerae normal  HENT: ear canals and TM's normal, nose and mouth without ulcers or lesions  NECK: no adenopathy, no asymmetry, masses, or scars and thyroid normal to palpation  RESP: lungs clear to auscultation - no rales, rhonchi or wheezes  BREAST: normal without masses, tenderness or nipple discharge and no palpable axillary masses or adenopathy  CV: regular rate and rhythm, normal S1 S2, no S3 or S4, no murmur, click or rub, no peripheral edema and peripheral pulses strong  ABDOMEN: soft, nontender, no hepatosplenomegaly, no masses and bowel sounds normal   (female): Deferred, patient is currently menstruating  MS: no gross musculoskeletal defects noted, no edema  SKIN: Both feet-scaly, dry skin patches on both soles, cracked skin at the heel  NEURO: Normal strength and tone, mentation intact and speech normal  PSYCH: mentation appears normal, affect normal/bright    Diagnostic Test Results:  Labs reviewed in Epic    ASSESSMENT/PLAN:   1. Routine general medical examination at a health care facility  Discussed on regular exercises, healthy eating, self breast exams monthly and routine dental checks.      - CBC with platelets differential; Future  - **Comprehensive metabolic panel FUTURE anytime; Future  - Lipid panel reflex to direct LDL Fasting; Future  - **TSH with free T4 reflex FUTURE anytime; Future    2. Tinea pedis of both feet  Reviewed the etiology of the skin condition  Reviewed skin care, keep the feet clean and dry  Prescription given for Nizoral " cream to use twice a day for the next 3 to 4 months  If symptoms are not any better in 4 weeks, patient understands to call for skin consult  - ketoconazole (NIZORAL) 2 % external cream; Apply topically 2 times daily  Dispense: 300 g; Refill: 3  - DERMATOLOGY REFERRAL    3. Morbid obesity with BMI of 40.0-44.9, adult (H)  Wt Readings from Last 5 Encounters:   06/26/19 107.9 kg (237 lb 12.8 oz)   01/12/18 103.8 kg (228 lb 14.4 oz)   08/23/17 100.7 kg (221 lb 14.4 oz)   04/19/17 95.1 kg (209 lb 11.2 oz)   03/10/16 86.6 kg (191 lb)     Emphasized on weight loss, portion control, low calorie and low fat diet, healthy eating, regular exercises. Offered dietary consult, declined. Encouraged to enroll in a weight loss program for tracking on meal planning and weight.    - **Comprehensive metabolic panel FUTURE anytime; Future  - Lipid panel reflex to direct LDL Fasting; Future  - **TSH with free T4 reflex FUTURE anytime; Future    4. CARDIOVASCULAR SCREENING; LDL GOAL LESS THAN 160  Patient reported having elevated triglycerides from recent medical insurance evaluation labs  We will check, Will f/u on results and call with recommendations.    - Lipid panel reflex to direct LDL Fasting; Future    5. Cervical cancer screening  Reviewed normal Pap from 2017, recheck in 2020    6. Screening for deficiency anemia    - CBC with platelets differential; Future    7. Screening for diabetes mellitus (DM)    - **Comprehensive metabolic panel FUTURE anytime; Future    8. Screening for thyroid disorder    - **TSH with free T4 reflex FUTURE anytime; Future    COUNSELING:   Reviewed preventive health counseling, as reflected in patient instructions  Special attention given to:        Regular exercise       Healthy diet/nutrition       Vision screening       Contraception       Family planning       Folic Acid Counseling    Estimated body mass index is 41.46 kg/m  as calculated from the following:    Height as of this encounter: 1.613 m  "(5' 3.5\").    Weight as of this encounter: 107.9 kg (237 lb 12.8 oz).    Weight management plan: Discussed healthy diet and exercise guidelines     reports that she has been smoking cigarettes.  She started smoking about 8 years ago. She has a 0.50 pack-year smoking history. She has never used smokeless tobacco.  Tobacco Cessation Action Plan: Information offered: Patient not interested at this time    Counseling Resources:  ATP IV Guidelines  Pooled Cohorts Equation Calculator  Breast Cancer Risk Calculator  FRAX Risk Assessment  ICSI Preventive Guidelines  Dietary Guidelines for Americans, 2010  USDA's MyPlate  ASA Prophylaxis  Lung CA Screening    Patricia Maya MD  [facility]  Chart documentation done in part with Dragon Voice recognition Software. Although reviewed after completion, some word and grammatical error may remain.    "

## 2019-10-05 ENCOUNTER — HEALTH MAINTENANCE LETTER (OUTPATIENT)
Age: 29
End: 2019-10-05

## 2020-05-11 ENCOUNTER — TELEPHONE (OUTPATIENT)
Dept: PEDIATRICS | Facility: CLINIC | Age: 30
End: 2020-05-11

## 2020-05-11 NOTE — TELEPHONE ENCOUNTER
Overdue results review encounter    Orders: 6/2019  Date ordered: cbc, cmp, thyroid, lipid  Defer?      Unclear if ordered test still needed. Routing to ordering provider for review.     Provider: Please cancel orders if no longer needed or route to pool for patient contact to schedule.

## 2020-11-14 ENCOUNTER — HEALTH MAINTENANCE LETTER (OUTPATIENT)
Age: 30
End: 2020-11-14

## 2021-09-12 ENCOUNTER — HEALTH MAINTENANCE LETTER (OUTPATIENT)
Age: 31
End: 2021-09-12

## 2022-01-02 ENCOUNTER — HEALTH MAINTENANCE LETTER (OUTPATIENT)
Age: 32
End: 2022-01-02

## 2022-11-19 ENCOUNTER — HEALTH MAINTENANCE LETTER (OUTPATIENT)
Age: 32
End: 2022-11-19

## 2023-04-09 ENCOUNTER — HEALTH MAINTENANCE LETTER (OUTPATIENT)
Age: 33
End: 2023-04-09